# Patient Record
Sex: FEMALE | Race: WHITE | NOT HISPANIC OR LATINO | Employment: OTHER | URBAN - NONMETROPOLITAN AREA
[De-identification: names, ages, dates, MRNs, and addresses within clinical notes are randomized per-mention and may not be internally consistent; named-entity substitution may affect disease eponyms.]

---

## 2017-06-01 ENCOUNTER — OFFICE VISIT (OUTPATIENT)
Dept: CARDIOLOGY | Facility: CLINIC | Age: 72
End: 2017-06-01
Payer: MEDICARE

## 2017-06-01 VITALS
BODY MASS INDEX: 34.16 KG/M2 | OXYGEN SATURATION: 94 % | HEIGHT: 60 IN | SYSTOLIC BLOOD PRESSURE: 142 MMHG | HEART RATE: 55 BPM | WEIGHT: 174 LBS | DIASTOLIC BLOOD PRESSURE: 80 MMHG

## 2017-06-01 DIAGNOSIS — D75.89 MACROCYTOSIS WITHOUT ANEMIA: ICD-10-CM

## 2017-06-01 DIAGNOSIS — R73.9 HYPERGLYCEMIA: ICD-10-CM

## 2017-06-01 DIAGNOSIS — G47.34 NOCTURNAL HYPOXEMIA: ICD-10-CM

## 2017-06-01 DIAGNOSIS — G47.33 OSA ON CPAP: ICD-10-CM

## 2017-06-01 DIAGNOSIS — R60.9 EDEMA, UNSPECIFIED TYPE: ICD-10-CM

## 2017-06-01 DIAGNOSIS — I10 ESSENTIAL HYPERTENSION: ICD-10-CM

## 2017-06-01 DIAGNOSIS — E78.5 HYPERLIPIDEMIA, UNSPECIFIED HYPERLIPIDEMIA TYPE: ICD-10-CM

## 2017-06-01 DIAGNOSIS — I51.7 CARDIOMEGALY: ICD-10-CM

## 2017-06-01 DIAGNOSIS — E66.9 OBESITY (BMI 30.0-34.9): ICD-10-CM

## 2017-06-01 PROCEDURE — 3017F COLORECTAL CA SCREEN DOC REV: CPT | Mod: 8P | Performed by: INTERNAL MEDICINE

## 2017-06-01 PROCEDURE — 99214 OFFICE O/P EST MOD 30 MIN: CPT | Performed by: INTERNAL MEDICINE

## 2017-06-01 PROCEDURE — G8419 CALC BMI OUT NRM PARAM NOF/U: HCPCS | Performed by: INTERNAL MEDICINE

## 2017-06-01 PROCEDURE — 3014F SCREEN MAMMO DOC REV: CPT | Mod: 8P | Performed by: INTERNAL MEDICINE

## 2017-06-01 PROCEDURE — 1036F TOBACCO NON-USER: CPT | Performed by: INTERNAL MEDICINE

## 2017-06-01 PROCEDURE — 1101F PT FALLS ASSESS-DOCD LE1/YR: CPT | Mod: 8P | Performed by: INTERNAL MEDICINE

## 2017-06-01 PROCEDURE — 4040F PNEUMOC VAC/ADMIN/RCVD: CPT | Mod: 8P | Performed by: INTERNAL MEDICINE

## 2017-06-01 PROCEDURE — G8432 DEP SCR NOT DOC, RNG: HCPCS | Performed by: INTERNAL MEDICINE

## 2017-06-01 RX ORDER — MAGNESIUM OXIDE 400 MG/1
400 TABLET ORAL DAILY
COMMUNITY
End: 2020-08-21

## 2017-06-01 RX ORDER — NAPROXEN 500 MG/1
TABLET ORAL
COMMUNITY
Start: 2017-05-03 | End: 2020-08-21

## 2017-06-01 NOTE — MR AVS SNAPSHOT
Liz Gautam   2017 9:20 AM   Office Visit   MRN: [id number]    Department:  Heart [facility]   Dept Phone:  [phone]    Description:  Female : 1945   Provider:  Emile Cardoza MD,[facility]           Reason for Visit     Follow-Up           Allergies as of 2017     Allergen Noted Reactions    Sulfa Drugs 10/21/2013   Swelling      You were diagnosed with     Cardiomegaly   [429.3.ICD-9-CM]       Edema, unspecified type   [9890184]       Essential hypertension   [3683706]       Obesity (BMI 30.0-34.9)   [800445]       Nocturnal hypoxemia   [096901]       REINALDO on CPAP   [039873]       Hyperlipidemia, unspecified hyperlipidemia type   [4316435]       Hyperglycemia   [651510]       Macrocytosis without anemia   [261561]         Vital Signs     Blood Pressure Pulse Height Weight Body Mass Index Oxygen Saturation    142/80 mmHg 55 1.524 m (5') 78.926 kg (174 lb) 33.98 kg/m2 94%    Smoking Status                   Former Smoker           Basic Information     Date Of Birth Sex Race Ethnicity Preferred Language    1945 Female White Non- English      Your appointments     Aug 17, 2017 12:40 PM   FOLLOW UP with Emile Cardoza MD,[facility] (--)    [address] [place] [id number] [phone]              Health Maintenance        Date Due Completion Dates    IMM DTaP/Tdap/Td Vaccine (1 - Tdap) 1965 ---    PAP SMEAR 1967 ---    MAMMOGRAM 1986 ---    IMM ZOSTER VACCINE 2006 ---    BONE DENSITY 2011 ---    IMM PNEUMOCOCCAL 65+ (ADULT) LOW/MEDIUM RISK SERIES (1 of 2 - PCV13) 2011 ---    COLONOSCOPY 10/23/2023 10/23/2013            Current Immunizations     No immunizations on file.      Below and/or attached are the medications your provider expects you to take. Review all of your home medications and newly ordered medications with your provider and/or pharmacist. Follow  medication instructions as directed by your provider and/or pharmacist. Please keep your medication list with you and share with your provider. Update the information when medications are discontinued, doses are changed, or new medications (including over-the-counter products) are added; and carry medication information at all times in the event of emergency situations     Allergies:  SULFA DRUGS - Swelling               Medications  Valid as of: June 01, 2017 -  9:47 AM    Generic Name Brand Name Tablet Size Instructions for use    Acyclovir (Tab) ZOVIRAX 400 MG         Bortezomib   by Injection route every 7 days.        Calcium Carbonate (Tab) OS-NICOLE 500 1250 (500 CA) MG Take 2,500 mg by mouth every day.        Cholecalciferol   Take  by mouth.        Citalopram Hydrobromide (Tab) CELEXA 20 MG Take 20 mg by mouth every day.        Dexamethasone (Tab) DECADRON 4 MG Take 40 mg by mouth every day.        Furosemide (Tab) LASIX 20 MG Take 20 mg by mouth 2 times a day.        Hydrocodone-Acetaminophen (Tab) NORCO 5-325 MG Take 1-2 Tabs by mouth every four hours as needed.        Lisinopril-Hydrochlorothiazide (Tab) PRINZIDE, ZESTORETIC 10-12.5 MG Take 1 Tab by mouth every day.        Magnesium Oxide (Tab) MAG- MG Take 400 mg by mouth every day.        Multiple Vitamins-Minerals (Tab) THERAGRAN-M  Take 1 Tab by mouth every day.        Naproxen (Tab) NAPROSYN 500 MG         Nystatin (Suspension) MYCOSTATIN 458316 UNIT/ML         Potassium Bicarbonate (Cap) Potassium Bicarbonate 99 MG Take  by mouth.        TraMADol HCl (Tab) ULTRAM 50 MG         .                 Medicines prescribed today were sent to:     None      Medication refill instructions:       If your prescription bottle indicates you have medication refills left, it is not necessary to call your provider’s office. Please contact your pharmacy and they will refill your medication.    If your prescription bottle indicates you do not have any refills  left, you may request refills at any time through one of the following ways: The online [employer] system (except Urgent Care), by calling your provider’s office, or by asking your pharmacy to contact your provider’s office with a refill request. Medication refills are processed only during regular business hours and may not be available until the next business day. Your provider may request additional information or to have a follow-up visit with you prior to refilling your medication.   *Please Note: Medication refills are assigned a new Rx number when refilled electronically. Your pharmacy may indicate that no refills were authorized even though a new prescription for the same medication is available at the pharmacy. Please request the medicine by name with the pharmacy before contacting your provider for a refill.        Your To Do List     Future Labs/Procedures Complete By Expires    COMP METABOLIC PANEL  As directed 6/1/2018    TSH  As directed 6/1/2018         [employer] Access Code: Activation code not generated  Current [employer] Status: Active

## 2017-06-01 NOTE — PROGRESS NOTES
Chief Complaint   Patient presents with   • Follow-Up       This patient is an established female who is here today to discuss:  Follow HLD, Sleep consult; REINALDO on CPAP with more energy, better sleep; Abn lab discussed, will recheck    There are no active problems to display for this patient.      Past Medical History   Diagnosis Date   • Arrhythmia      IRREGULAR HEART RATE HX.//BRADYCARDIA  AT TIMES   • Other specified disorder of intestines      INTERNAL HEMORROIDS   • Hypertension      ON LISINOPRIL   • Cancer (CMS-HCC)      PT STATES HX OF MGUS (pre cancer condition)   • Pain      chronic rt leg muscle ache   • Pelvic fracture (CMS-HCC)      non traumatic     Past Surgical History   Procedure Laterality Date   • Colonoscopy  2008     family hx of colon cancer   • Appendectomy     • Oophorectomy     • Exploratory laparotomy     • Cataract extraction with iol     • Colonoscopy - endo  10/23/2013     Performed by Surinder Sanchez M.D. at SURGERY [facility]       Current Outpatient Prescriptions   Medication Sig Dispense Refill   • naproxen (NAPROSYN) 500 MG Tab      • Potassium Bicarbonate 99 MG Cap Take  by mouth.     • magnesium oxide (MAG-OX) 400 MG Tab Take 400 mg by mouth every day.     • lisinopril-hydrochlorothiazide (PRINZIDE, ZESTORETIC) 10-12.5 MG per tablet Take 1 Tab by mouth every day.     • calcium carbonate (OS-NICOLE 500) 1250 MG TABS Take 2,500 mg by mouth every day.     • Cholecalciferol (VITAMIN D-3 PO) Take  by mouth.     • therapeutic multivitamin-minerals (THERAGRAN-M) TABS Take 1 Tab by mouth every day.     • tramadol (ULTRAM) 50 MG Tab      • acyclovir (ZOVIRAX) 400 MG tablet      • furosemide (LASIX) 20 MG Tab Take 20 mg by mouth 2 times a day.     • nystatin (MYCOSTATIN) 204362 UNIT/ML SUSP      • hydrocodone-acetaminophen (NORCO) 5-325 MG TABS per tablet Take 1-2 Tabs by mouth every four hours as needed.     • dexamethasone (DECADRON) 4 MG TABS Take 40 mg by mouth every day.     • Bortezomib  (VELCADE INJ) by Injection route every 7 days.     • citalopram (CELEXA) 20 MG TABS Take 20 mg by mouth every day.       No current facility-administered medications for this visit.     Sulfa drugs      Review of Systems:     Constitutional: Denies fevers, Denies weight changes  Eyes: Denies changes in vision, no eye pain  Ears/Nose/Throat/Mouth: Denies nasal congestion or sore throat   Cardiovascular: Denies chest pain or palpitations   Respiratory: Denies shortness of breath , Denies cough  Gastrointestinal/Hepatic: Denies abdominal pain, nausea, vomiting, diarrhea, constipation or GI bleeding   Genitourinary: Denies bladder dysfunction, dysuria or frequency  Musculoskeletal/Rheum: Denies  joint pain and swelling   Skin/Breast: Denies rash, denies breast lumps or discharge  Neurological: Denies headache, confusion, memory loss or focal weakness/parasthesias  Psychiatric: denies mood disorder   Endocrine: denies hx of diabetes or thyroid dysfunction  Heme/Oncology/Lymph Nodes: Denies enlarged lymph nodes, denies brusing or known bleeding disorder  Allergic/Immunologic:  hx of allergies      All other systems were reviewed and are negative (AMA/CMS criteria)      Blood pressure 142/80, pulse 55, height 1.524 m (5'), weight 78.926 kg (174 lb), SpO2 94 %.  General Appearance: Well developed, Well nourished, No acute distress, Non-toxic appearance. Reproducible costochondral pain;  HENT: Normocephalic, Atraumatic, Oropharynx moist mucous membranes, Dentition: Mallampti 3-4 OP, Nose normal.    Eyes: PERRLA, EOMI, Conjunctiva normal, No discharge.  Neck: Normal range of motion, No cervical tenderness, Supple, No stridor, no JVD . No thyromegaly. No carotid bruit.  Cardiovascular: Slow heart rate, Normal rhythm, nl S1, S2, no S3, S4; No gallops; 1/6 SE murmurs at M&LLSB, No rubs, . Extremitites with intact distal pulses, no cyanosis, clubbing , edema. No heaves, thrills, HJR;  Peripheral pulses: carotid 2+, brachial 2+,  radial 2+, ulnar 2+, femoral 2+, popliteal 1+, PT 2+, DP 2+;  Lungs: Respiratory effort is normal. Normal breath sounds, breath sounds clear to auscultation bilaterally, no rales, no rhonchi, no wheezing.    Abdomen: Bowel sounds normal, Soft, No tenderness, No guarding, No rebound, No masses, No hepatosplenomegaly.  Skin: Warm, Dry, No erythema, No rash, no induration or crepitus.  Neurologic: Alert & oriented x 3, Normal motor function, Normal sensory function, No focal deficits noted, cranial nerves II through XII are normal, normal gait w/o cane.  Psychiatric: Affect normal, Judgment normal, Mood normal    Results for DAI PATEL (MRN [id number]) as of 6/1/2017 09:30   Ref. Range 6/29/2016 07:40 6/29/2016 08:59 8/5/2016 12:04 12/20/2016 07:30   WBC Latest Ref Range: 4.8-10.8 K/uL   7.0 8.3   RBC Latest Ref Range: 4.20-5.40 M/uL   4.75 4.59   Hemoglobin Latest Ref Range: 13.0-17.0 g/dL   14.2 14.8   Hematocrit Latest Ref Range: 39.0-50.0 %   44.3 46.6   MCV Latest Ref Range: 81.0-99.0 fL   93.3 101.5 (H)   MCH Latest Ref Range: 27.0-31.0 pg   29.9 32.2 (H)   MCHC Latest Ref Range: 33.0-37.0 g/dL   32.1 (L) 31.8 (L)   RDW Latest Ref Range: 11.5-14.5 %   15.9 (H) 13.7   Platelet Count Latest Ref Range: 130-400 K/uL   184 206   MPV Latest Ref Range: 7.4-10.4 fL   9.1 9.2   Neutrophils Automated Latest Ref Range: 39.0-70.0 %   65.8 74.9 (H)   Abs Neutrophils Automated Latest Ref Range: 1.8-7.7 K/uL   4.6 6.2   Lymphocytes Automated Latest Ref Range: 21.0-50.0 %   27.6 18.3 (L)   Abs Lymph Automated Latest Ref Range: 1.2-4.8 K/uL   1.9 1.5   Eosinophils Automated Latest Ref Range: 0.0-5.0 %   1.0 0.8   Basophils Automated Latest Ref Range: 0.0-3.0 %   0.3 0.4   Monocytes Automated Latest Ref Range: 2.0-9.0 %   5.0 5.0   Eosinophil Count, Blood Latest Ref Range: 0.00-0.50 K/uL   0.07 0.07   Sodium Latest Ref Range: 136-145 mmol/L   145 149 (H)   Potassium Latest Ref Range: 3.5-5.1 mmol/L   4.3 4.5   Chloride Latest  Ref Range:  mmol/L   110 (H) 110 (H)   Co2 Latest Ref Range: 21-32 mmol/L   31 31   Anion Gap Latest Ref Range: 10-18 mmol/L   8 (L) 13   Glucose Latest Ref Range: 74-99 mg/dL   94 116 (H)   Bun Latest Ref Range: 7-18 mg/dL   18 23 (H)   Creatinine Latest Ref Range: 0.6-1.0 mg/dL   0.6 0.7   GFR If  Latest Ref Range: >60 mL/min/1.73 m 2   >60 >60   GFR If Non  Latest Ref Range: >60 mL/min/1.73 m 2   >60 >60   Calcium Latest Ref Range: 8.5-11.0 mg/dL   9.5 10.3   AST(SGOT) Latest Ref Range: 15-37 U/L   17 18   ALT(SGPT) Latest Ref Range: 12-78 U/L   24 26   Alkaline Phosphatase Latest Ref Range:  U/L   104 96   Total Bilirubin Latest Ref Range: 0.2-1.0 mg/dL   0.5 0.5   Albumin Latest Ref Range: 3.4-5.0 g/dL   3.6 3.6   Total Protein Latest Ref Range: 6.4-8.2 g/dL   6.3 (L) 6.4   A-G Ratio Unknown   1.3 1.3   Glycohemoglobin Latest Ref Range: <6.0 %  5.7 (H)     Estim. Avg Glu Latest Units: mg/dL  117     Cholesterol,Tot Latest Ref Range: 120-200 mg/dL 185      Triglycerides Latest Ref Range: 0-150 mg/dL 53      HDL Latest Ref Range: 40.0-60.0 mg/dL 70.0 (H)      Non HDL Cholesterol Latest Ref Range:   115      LDL Latest Ref Range: <100 mg/dL 104 (H)      Chol-Hdl Ratio Unknown 2.64          Assessment and Plan.   71 y.o. female has REINALDO and now on CPAP with sig improvement; Cardiac aspects are stable and asx; will follow; Pls see orders; w/u for Macrocytosis;     1. Cardiomegaly  stable    2. Edema, unspecified type  Trace now; better than before    3. Essential hypertension  controlled    4. Obesity (BMI 30.0-34.9)  stable    5. Nocturnal hypoxemia  REINALDO    6. REINALDO on CPAP  continue    7. Hyperlipidemia, unspecified hyperlipidemia type  recheck      1. Cardiomegaly     2. Edema, unspecified type     3. Essential hypertension     4. Obesity (BMI 30.0-34.9)     5. Nocturnal hypoxemia     6. REINALDO on CPAP     7. Hyperlipidemia, unspecified hyperlipidemia type

## 2017-06-01 NOTE — Clinical Note
[facility] [facility] [address],   [address]  [place] [address]  Phone: [phone]  Fax: [phone]              Liz Florez  1945    Encounter Date: 6/1/2017    Gerardo Bishop M.D.    Thank you for the referral. I had the pleasure of seeing Liz Florez today in cardiology clinic. I've attached my visit note below. If you have any questions please feel free to give me a call anytime.      Emile Cardoza MD, PhD, [facility]  Cardiology and Lipidology  [facility]                                                                PROGRESS NOTE:  Chief Complaint   Patient presents with   • Follow-Up       This patient is an established female who is here today to discuss:  Follow HLD, Sleep consult; REINALDO on CPAP with more energy, better sleep; Abn lab discussed, will recheck    There are no active problems to display for this patient.      Past Medical History   Diagnosis Date   • Arrhythmia      IRREGULAR HEART RATE HX.//BRADYCARDIA  AT TIMES   • Other specified disorder of intestines      INTERNAL HEMORROIDS   • Hypertension      ON LISINOPRIL   • Cancer (CMS-HCC)      PT STATES HX OF MGUS (pre cancer condition)   • Pain      chronic rt leg muscle ache   • Pelvic fracture (CMS-HCC)      non traumatic     Past Surgical History   Procedure Laterality Date   • Colonoscopy  2008     family hx of colon cancer   • Appendectomy     • Oophorectomy     • Exploratory laparotomy     • Cataract extraction with iol     • Colonoscopy - endo  10/23/2013     Performed by Surinder Sanchez M.D. at SURGERY [facility]       Current Outpatient Prescriptions   Medication Sig Dispense Refill   • naproxen (NAPROSYN) 500 MG Tab      • Potassium Bicarbonate 99 MG Cap Take  by mouth.     • magnesium oxide (MAG-OX) 400 MG Tab Take 400 mg by mouth every day.     • lisinopril-hydrochlorothiazide (PRINZIDE, ZESTORETIC) 10-12.5 MG per tablet  Take 1 Tab by mouth every day.     • calcium carbonate (OS-NICOLE 500) 1250 MG TABS Take 2,500 mg by mouth every day.     • Cholecalciferol (VITAMIN D-3 PO) Take  by mouth.     • therapeutic multivitamin-minerals (THERAGRAN-M) TABS Take 1 Tab by mouth every day.     • tramadol (ULTRAM) 50 MG Tab      • acyclovir (ZOVIRAX) 400 MG tablet      • furosemide (LASIX) 20 MG Tab Take 20 mg by mouth 2 times a day.     • nystatin (MYCOSTATIN) 100755 UNIT/ML SUSP      • hydrocodone-acetaminophen (NORCO) 5-325 MG TABS per tablet Take 1-2 Tabs by mouth every four hours as needed.     • dexamethasone (DECADRON) 4 MG TABS Take 40 mg by mouth every day.     • Bortezomib (VELCADE INJ) by Injection route every 7 days.     • citalopram (CELEXA) 20 MG TABS Take 20 mg by mouth every day.       No current facility-administered medications for this visit.     Sulfa drugs      Review of Systems:     Constitutional: Denies fevers, Denies weight changes  Eyes: Denies changes in vision, no eye pain  Ears/Nose/Throat/Mouth: Denies nasal congestion or sore throat   Cardiovascular: Denies chest pain or palpitations   Respiratory: Denies shortness of breath , Denies cough  Gastrointestinal/Hepatic: Denies abdominal pain, nausea, vomiting, diarrhea, constipation or GI bleeding   Genitourinary: Denies bladder dysfunction, dysuria or frequency  Musculoskeletal/Rheum: Denies  joint pain and swelling   Skin/Breast: Denies rash, denies breast lumps or discharge  Neurological: Denies headache, confusion, memory loss or focal weakness/parasthesias  Psychiatric: denies mood disorder   Endocrine: denies hx of diabetes or thyroid dysfunction  Heme/Oncology/Lymph Nodes: Denies enlarged lymph nodes, denies brusing or known bleeding disorder  Allergic/Immunologic:  hx of allergies      All other systems were reviewed and are negative (AMA/CMS criteria)      Blood pressure 142/80, pulse 55, height 1.524 m (5'), weight 78.926 kg (174 lb), SpO2 94 %.  General  Appearance: Well developed, Well nourished, No acute distress, Non-toxic appearance. Reproducible costochondral pain;  HENT: Normocephalic, Atraumatic, Oropharynx moist mucous membranes, Dentition: Mallampti 3-4 OP, Nose normal.    Eyes: PERRLA, EOMI, Conjunctiva normal, No discharge.  Neck: Normal range of motion, No cervical tenderness, Supple, No stridor, no JVD . No thyromegaly. No carotid bruit.  Cardiovascular: Slow heart rate, Normal rhythm, nl S1, S2, no S3, S4; No gallops; 1/6 SE murmurs at M&LLSB, No rubs, . Extremitites with intact distal pulses, no cyanosis, clubbing , edema. No heaves, thrills, HJR;  Peripheral pulses: carotid 2+, brachial 2+, radial 2+, ulnar 2+, femoral 2+, popliteal 1+, PT 2+, DP 2+;  Lungs: Respiratory effort is normal. Normal breath sounds, breath sounds clear to auscultation bilaterally, no rales, no rhonchi, no wheezing.    Abdomen: Bowel sounds normal, Soft, No tenderness, No guarding, No rebound, No masses, No hepatosplenomegaly.  Skin: Warm, Dry, No erythema, No rash, no induration or crepitus.  Neurologic: Alert & oriented x 3, Normal motor function, Normal sensory function, No focal deficits noted, cranial nerves II through XII are normal, normal gait w/o cane.  Psychiatric: Affect normal, Judgment normal, Mood normal    Results for DAI PATEL (MRN [id number]) as of 6/1/2017 09:30   Ref. Range 6/29/2016 07:40 6/29/2016 08:59 8/5/2016 12:04 12/20/2016 07:30   WBC Latest Ref Range: 4.8-10.8 K/uL   7.0 8.3   RBC Latest Ref Range: 4.20-5.40 M/uL   4.75 4.59   Hemoglobin Latest Ref Range: 13.0-17.0 g/dL   14.2 14.8   Hematocrit Latest Ref Range: 39.0-50.0 %   44.3 46.6   MCV Latest Ref Range: 81.0-99.0 fL   93.3 101.5 (H)   MCH Latest Ref Range: 27.0-31.0 pg   29.9 32.2 (H)   MCHC Latest Ref Range: 33.0-37.0 g/dL   32.1 (L) 31.8 (L)   RDW Latest Ref Range: 11.5-14.5 %   15.9 (H) 13.7   Platelet Count Latest Ref Range: 130-400 K/uL   184 206   MPV Latest Ref Range: 7.4-10.4 fL    9.1 9.2   Neutrophils Automated Latest Ref Range: 39.0-70.0 %   65.8 74.9 (H)   Abs Neutrophils Automated Latest Ref Range: 1.8-7.7 K/uL   4.6 6.2   Lymphocytes Automated Latest Ref Range: 21.0-50.0 %   27.6 18.3 (L)   Abs Lymph Automated Latest Ref Range: 1.2-4.8 K/uL   1.9 1.5   Eosinophils Automated Latest Ref Range: 0.0-5.0 %   1.0 0.8   Basophils Automated Latest Ref Range: 0.0-3.0 %   0.3 0.4   Monocytes Automated Latest Ref Range: 2.0-9.0 %   5.0 5.0   Eosinophil Count, Blood Latest Ref Range: 0.00-0.50 K/uL   0.07 0.07   Sodium Latest Ref Range: 136-145 mmol/L   145 149 (H)   Potassium Latest Ref Range: 3.5-5.1 mmol/L   4.3 4.5   Chloride Latest Ref Range:  mmol/L   110 (H) 110 (H)   Co2 Latest Ref Range: 21-32 mmol/L   31 31   Anion Gap Latest Ref Range: 10-18 mmol/L   8 (L) 13   Glucose Latest Ref Range: 74-99 mg/dL   94 116 (H)   Bun Latest Ref Range: 7-18 mg/dL   18 23 (H)   Creatinine Latest Ref Range: 0.6-1.0 mg/dL   0.6 0.7   GFR If  Latest Ref Range: >60 mL/min/1.73 m 2   >60 >60   GFR If Non  Latest Ref Range: >60 mL/min/1.73 m 2   >60 >60   Calcium Latest Ref Range: 8.5-11.0 mg/dL   9.5 10.3   AST(SGOT) Latest Ref Range: 15-37 U/L   17 18   ALT(SGPT) Latest Ref Range: 12-78 U/L   24 26   Alkaline Phosphatase Latest Ref Range:  U/L   104 96   Total Bilirubin Latest Ref Range: 0.2-1.0 mg/dL   0.5 0.5   Albumin Latest Ref Range: 3.4-5.0 g/dL   3.6 3.6   Total Protein Latest Ref Range: 6.4-8.2 g/dL   6.3 (L) 6.4   A-G Ratio Unknown   1.3 1.3   Glycohemoglobin Latest Ref Range: <6.0 %  5.7 (H)     Estim. Avg Glu Latest Units: mg/dL  117     Cholesterol,Tot Latest Ref Range: 120-200 mg/dL 185      Triglycerides Latest Ref Range: 0-150 mg/dL 53      HDL Latest Ref Range: 40.0-60.0 mg/dL 70.0 (H)      Non HDL Cholesterol Latest Ref Range:   115      LDL Latest Ref Range: <100 mg/dL 104 (H)      Chol-Hdl Ratio Unknown 2.64          Assessment and Plan.   71  y.o. female has REINALDO and now on CPAP with sig improvement; Cardiac aspects are stable and asx; will follow; Pls see orders; w/u for Macrocytosis;     1. Cardiomegaly  stable    2. Edema, unspecified type  Trace now; better than before    3. Essential hypertension  controlled    4. Obesity (BMI 30.0-34.9)  stable    5. Nocturnal hypoxemia  REINALDO    6. REINALDO on CPAP  continue    7. Hyperlipidemia, unspecified hyperlipidemia type  recheck      1. Cardiomegaly     2. Edema, unspecified type     3. Essential hypertension     4. Obesity (BMI 30.0-34.9)     5. Nocturnal hypoxemia     6. REINALDO on CPAP     7. Hyperlipidemia, unspecified hyperlipidemia type               Gerardo Bishop M.D.  [address] [place] [address] [place] [address]  VIA Facsimile: [phone]

## 2017-08-17 ENCOUNTER — OFFICE VISIT (OUTPATIENT)
Dept: CARDIOLOGY | Facility: CLINIC | Age: 72
End: 2017-08-17
Payer: MEDICARE

## 2017-08-17 VITALS
WEIGHT: 171 LBS | HEIGHT: 60 IN | SYSTOLIC BLOOD PRESSURE: 120 MMHG | HEART RATE: 43 BPM | BODY MASS INDEX: 33.57 KG/M2 | DIASTOLIC BLOOD PRESSURE: 80 MMHG | OXYGEN SATURATION: 92 %

## 2017-08-17 DIAGNOSIS — R60.9 EDEMA, UNSPECIFIED TYPE: ICD-10-CM

## 2017-08-17 DIAGNOSIS — E66.9 OBESITY (BMI 30.0-34.9): ICD-10-CM

## 2017-08-17 DIAGNOSIS — I10 ESSENTIAL HYPERTENSION: ICD-10-CM

## 2017-08-17 DIAGNOSIS — I51.7 CARDIOMEGALY: ICD-10-CM

## 2017-08-17 DIAGNOSIS — E78.5 HYPERLIPIDEMIA, UNSPECIFIED HYPERLIPIDEMIA TYPE: ICD-10-CM

## 2017-08-17 DIAGNOSIS — G47.33 OSA ON CPAP: ICD-10-CM

## 2017-08-17 PROCEDURE — 99214 OFFICE O/P EST MOD 30 MIN: CPT | Performed by: INTERNAL MEDICINE

## 2017-08-17 NOTE — PROGRESS NOTES
Chief Complaint   Patient presents with   • Follow-Up     HTN         This patient is an established female who is here today to discuss:  Follow HLD, Sleep consult; REINALDO on CPAP with more energy, better sleep; Abn lab discussed, will recheck    There are no active problems to display for this patient.      Past Medical History   Diagnosis Date   • Arrhythmia      IRREGULAR HEART RATE HX.//BRADYCARDIA  AT TIMES   • Other specified disorder of intestines      INTERNAL HEMORROIDS   • Hypertension      ON LISINOPRIL   • Cancer (CMS-HCC)      PT STATES HX OF MGUS (pre cancer condition)   • Pain      chronic rt leg muscle ache   • Pelvic fracture (CMS-HCC)      non traumatic     Past Surgical History   Procedure Laterality Date   • Colonoscopy  2008     family hx of colon cancer   • Appendectomy     • Oophorectomy     • Exploratory laparotomy     • Cataract extraction with iol     • Colonoscopy - endo  10/23/2013     Performed by Surinder Sanchez M.D. at [facility]     Social History     Social History   • Marital Status: Single     Spouse Name: N/A   • Number of Children: N/A   • Years of Education: N/A     Social History Main Topics   • Smoking status: Former Smoker     Quit date: 10/21/1996   • Smokeless tobacco: None   • Alcohol Use: Yes   • Drug Use: No   • Sexual Activity: Not Asked     Other Topics Concern   • None     Social History Narrative     Family History   Problem Relation Age of Onset   • Heart Disease Maternal Uncle        Current Outpatient Prescriptions   Medication Sig Dispense Refill   • naproxen (NAPROSYN) 500 MG Tab      • magnesium oxide (MAG-OX) 400 MG Tab Take 400 mg by mouth every day.     • lisinopril-hydrochlorothiazide (PRINZIDE, ZESTORETIC) 10-12.5 MG per tablet Take 1 Tab by mouth every day.     • calcium carbonate (OS-NICOLE 500) 1250 MG TABS Take 2,500 mg by mouth every day.     • Cholecalciferol (VITAMIN D-3 PO) Take  by mouth.     • therapeutic multivitamin-minerals (THERAGRAN-M)  TABS Take 1 Tab by mouth every day.     • Potassium Bicarbonate 99 MG Cap Take  by mouth.     • tramadol (ULTRAM) 50 MG Tab      • acyclovir (ZOVIRAX) 400 MG tablet      • furosemide (LASIX) 20 MG Tab Take 20 mg by mouth 2 times a day.     • nystatin (MYCOSTATIN) 321608 UNIT/ML SUSP      • hydrocodone-acetaminophen (NORCO) 5-325 MG TABS per tablet Take 1-2 Tabs by mouth every four hours as needed.     • dexamethasone (DECADRON) 4 MG TABS Take 40 mg by mouth every day.     • Bortezomib (VELCADE INJ) by Injection route every 7 days.     • citalopram (CELEXA) 20 MG TABS Take 20 mg by mouth every day.       No current facility-administered medications for this visit.     Sulfa drugs    Review of Systems:     Constitutional: Denies fevers, Denies weight changes  Eyes: Denies changes in vision, no eye pain  Ears/Nose/Throat/Mouth: Denies nasal congestion or sore throat   Cardiovascular: Denies chest pain or palpitations   Respiratory: Denies shortness of breath , Denies cough  Gastrointestinal/Hepatic: Denies abdominal pain, nausea, vomiting, diarrhea, constipation or GI bleeding   Genitourinary: Denies bladder dysfunction, dysuria or frequency  Musculoskeletal/Rheum: Denies  joint pain and swelling   Skin/Breast: Denies rash, denies breast lumps or discharge  Neurological: Denies headache, confusion, memory loss or focal weakness/parasthesias  Psychiatric: denies mood disorder   Endocrine: denies hx of diabetes or thyroid dysfunction  Heme/Oncology/Lymph Nodes: Denies enlarged lymph nodes, denies brusing or known bleeding disorder  Allergic/Immunologic:  hx of allergies      All other systems were reviewed and are negative (AMA/CMS criteria)      Blood pressure 120/80, pulse 43, height 1.524 m (5'), weight 77.565 kg (171 lb), SpO2 92 %.  General Appearance: Well developed, Well nourished, No acute distress, Non-toxic appearance. Reproducible costochondral pain;  HENT: Normocephalic, Atraumatic, Oropharynx moist mucous  membranes, Dentition: Mallampti 3-4 OP, Nose normal.    Eyes: PERRLA, EOMI, Conjunctiva normal, No discharge.  Neck: Normal range of motion, No cervical tenderness, Supple, No stridor, no JVD . No thyromegaly. No carotid bruit.  Cardiovascular: Slow heart rate, Normal rhythm, nl S1, S2, no S3, S4; No gallops; 1/6 SE murmurs at M&LLSB, No rubs, . Extremitites with intact distal pulses, no cyanosis, clubbing , edema. No heaves, thrills, HJR;  Peripheral pulses: carotid 2+, brachial 2+, radial 2+, ulnar 2+, femoral 2+, popliteal 1+, PT 2+, DP 2+;  Lungs: Respiratory effort is normal. Normal breath sounds, breath sounds clear to auscultation bilaterally, no rales, no rhonchi, no wheezing.    Abdomen: Bowel sounds normal, Soft, No tenderness, No guarding, No rebound, No masses, No hepatosplenomegaly.  Skin: Warm, Dry, No erythema, No rash, no induration or crepitus.  Neurologic: Alert & oriented x 3, Normal motor function, Normal sensory function, No focal deficits noted, cranial nerves II through XII are normal, normal gait w/o cane.  Psychiatric: Affect normal, Judgment normal, Mood normal    Results for DAI PATEL (MRN [id number]) as of 8/17/2017 12:38   Ref. Range 12/20/2016 07:30 7/20/2017 09:40 8/14/2017 12:53   WBC Latest Ref Range: 4.8-10.8 K/uL 8.3 6.7    RBC Latest Ref Range: 4.20-5.40 M/uL 4.59 4.71    Hemoglobin Latest Ref Range: 13.0-17.0 g/dL 14.8 14.4    Hematocrit Latest Ref Range: 39.0-50.0 % 46.6 44.8    MCV Latest Ref Range: 81.0-99.0 fL 101.5 (H) 95.1    MCH Latest Ref Range: 27.0-31.0 pg 32.2 (H) 30.6    MCHC Latest Ref Range: 33.0-37.0 g/dL 31.8 (L) 32.1 (L)    RDW Latest Ref Range: 11.5-14.5 % 13.7 14.1    Platelet Count Latest Ref Range: 130-400 K/uL 206 179    MPV Latest Ref Range: 7.4-10.4 fL 9.2 9.7    Neutrophils Automated Latest Ref Range: 39.0-70.0 % 74.9 (H) 70.9 (H)    Abs Neutrophils Automated Latest Ref Range: 1.8-7.7 K/uL 6.2 4.7    Lymphocytes Automated Latest Ref Range: 21.0-50.0 %  18.3 (L) 24.1    Abs Lymph Automated Latest Ref Range: 1.2-4.8 K/uL 1.5 1.6    Eosinophils Automated Latest Ref Range: 0.0-5.0 % 0.8 0.4    Basophils Automated Latest Ref Range: 0.0-3.0 % 0.4 0.3    Monocytes Automated Latest Ref Range: 2.0-9.0 % 5.0 3.9    Eosinophil Count, Blood Latest Ref Range: 0.00-0.50 K/uL 0.07 0.03    Sodium Latest Ref Range: 136-145 mmol/L 149 (H) 143    Potassium Latest Ref Range: 3.5-5.1 mmol/L 4.5 4.1    Chloride Latest Ref Range:  mmol/L 110 (H) 107    Co2 Latest Ref Range: 21-32 mmol/L 31 28    Anion Gap Latest Ref Range: 10-18 mmol/L 13 12    Glucose Latest Ref Range: 74-99 mg/dL 116 (H) 108 (H)    Bun Latest Ref Range: 7-18 mg/dL 23 (H) 18    Creatinine Latest Ref Range: 0.6-1.0 mg/dL 0.7 0.7    GFR If  Latest Ref Range: >60 mL/min/1.73 m 2 >60 >60    GFR If Non  Latest Ref Range: >60 mL/min/1.73 m 2 >60 >60    Calcium Latest Ref Range: 8.5-11.0 mg/dL 10.3 9.4    AST(SGOT) Latest Ref Range: 15-37 U/L 18 17    ALT(SGPT) Latest Ref Range: 12-78 U/L 26 27    Alkaline Phosphatase Latest Ref Range:  U/L 96 87    Total Bilirubin Latest Ref Range: 0.2-1.0 mg/dL 0.5 0.5    Albumin Latest Ref Range: 3.4-5.0 g/dL 3.6 3.7    Total Protein Latest Ref Range: 6.4-8.2 g/dL 6.4 6.8    A-G Ratio Unknown 1.3 1.2    IgA Total Latest Ref Range:  mg/dL 74 (L) 85    IgG Total Latest Ref Range: 694-1618 mg/dL 675 (L) 901    IgM Total Latest Ref Range:  mg/dL 44 (L) 33 (L)    Total Protein Latest Ref Range: 6.1-8.1 g/dL 6.3 6.4    Albumin Latest Ref Range: 3.8-4.8 g/dL 3.9 3.9    Gamma Globulin Latest Ref Range: 0.8-1.7 g/dL 0.7 (L) 0.9    Alpha-1 Globulin Latest Ref Range: 0.2-0.3 g/dL 0.3 0.2    Alpha-2 Globulin Latest Ref Range: 0.5-0.9 g/dL 0.8 0.7    Free Kappa Light Chains Latest Ref Range: 3.3-19.4 mg/L 9.2 8.1    Free Lambda Light Chains Latest Ref Range: 5.7-26.3 mg/L 12.0 8.1    Kappa-Lambda Ratio Latest Ref Range: 0.26-1.65  0.77 1.00     Protein Elect. Interp Unknown See Below See Below        Assessment and Plan.   71 y.o. female is with ENT, sleep eval with REINALDO, acid reflux and running nose; Pepsid use may help; She will also try to cut down artificial sweetner use and try to lose weight.     1. Cardiomegaly  reviewed    2. Edema, unspecified type  Mild radha on left but stable    3. Essential hypertension  controlled    4. Obesity (BMI 30.0-34.9)  Stable, see above    5. REINALDO on CPAP  continue    6. Hyperlipidemia, unspecified hyperlipidemia type  recheck      Return to clinic in  5  months    1. Cardiomegaly     2. Edema, unspecified type     3. Essential hypertension     4. Obesity (BMI 30.0-34.9)     5. REINALDO on CPAP     6. Hyperlipidemia, unspecified hyperlipidemia type

## 2017-08-17 NOTE — MR AVS SNAPSHOT
Liz Menesescarlos eduardo   2017 12:40 PM   Office Visit   MRN: [id number]    Department:  Heart [facility]   Dept Phone:  [phone]    Description:  Female : 1945   Provider:  Emile Cardoza MD,[facility]           Reason for Visit     Follow-Up HTN      Allergies as of 2017     Allergen Noted Reactions    Sulfa Drugs 10/21/2013   Swelling      You were diagnosed with     Cardiomegaly   [429.3.ICD-9-CM]       Edema, unspecified type   [8275186]       Essential hypertension   [6119562]       Obesity (BMI 30.0-34.9)   [730278]       REINALDO on CPAP   [342176]       Hyperlipidemia, unspecified hyperlipidemia type   [8005765]         Vital Signs     Blood Pressure Pulse Height Weight Body Mass Index Oxygen Saturation    120/80 mmHg 43 1.524 m (5') 77.565 kg (171 lb) 33.40 kg/m2 92%    Smoking Status                   Former Smoker           Basic Information     Date Of Birth Sex Race Ethnicity Preferred Language    1945 Female White Non- English      Your appointments     Dec 05, 2017 10:00 AM   FOLLOW UP with Em[facility] Card[facility]za MD,[facility] (--)    [address] [place] [address] [phone]              Health Maintenance        Date Due Completion Dates    IMM DTaP/Tdap/Td Vaccine (1 - Tdap) 1965 ---    PAP SMEAR 1967 ---    MAMMOGRAM 1986 ---    IMM ZOSTER VACCINE 2006 ---    BONE DENSITY 2011 ---    IMM PNEUMOCOCCAL 65+ (ADULT) LOW/MEDIUM RISK SERIES (1 of 2 - PCV13) 2011 ---    IMM INFLUENZA (1) 2017 ---    COLONOSCOPY 10/23/2023 10/23/2013            Current Immunizations     No immunizations on file.      Below and/or attached are the medications your provider expects you to take. Review all of your home medications and newly ordered medications with your provider and/or pharmacist. Follow medication instructions as directed by your provider and/or pharmacist. Please keep  your medication list with you and share with your provider. Update the information when medications are discontinued, doses are changed, or new medications (including over-the-counter products) are added; and carry medication information at all times in the event of emergency situations     Allergies:  SULFA DRUGS - Swelling               Medications  Valid as of: August 17, 2017 - 12:57 PM    Generic Name Brand Name Tablet Size Instructions for use    Acyclovir (Tab) ZOVIRAX 400 MG         Bortezomib   by Injection route every 7 days.        Calcium Carbonate (Tab) OS-NICOLE 500 1250 (500 Ca) MG Take 2,500 mg by mouth every day.        Cholecalciferol   Take  by mouth.        Citalopram Hydrobromide (Tab) CELEXA 20 MG Take 20 mg by mouth every day.        Dexamethasone (Tab) DECADRON 4 MG Take 40 mg by mouth every day.        Furosemide (Tab) LASIX 20 MG Take 20 mg by mouth 2 times a day.        Hydrocodone-Acetaminophen (Tab) NORCO 5-325 MG Take 1-2 Tabs by mouth every four hours as needed.        Lisinopril-Hydrochlorothiazide (Tab) PRINZIDE, ZESTORETIC 10-12.5 MG Take 1 Tab by mouth every day.        Magnesium Oxide (Tab) MAG- MG Take 400 mg by mouth every day.        Multiple Vitamins-Minerals (Tab) THERAGRAN-M  Take 1 Tab by mouth every day.        Naproxen (Tab) NAPROSYN 500 MG         Nystatin (Suspension) MYCOSTATIN 710641 UNIT/ML         Potassium Bicarbonate (Cap) Potassium Bicarbonate 99 MG Take  by mouth.        TraMADol HCl (Tab) ULTRAM 50 MG         .                 Medicines prescribed today were sent to:     None      Medication refill instructions:       If your prescription bottle indicates you have medication refills left, it is not necessary to call your provider’s office. Please contact your pharmacy and they will refill your medication.    If your prescription bottle indicates you do not have any refills left, you may request refills at any time through one of the following ways: The  online Learn with [employer] system (except Urgent Care), by calling your provider’s office, or by asking your pharmacy to contact your provider’s office with a refill request. Medication refills are processed only during regular business hours and may not be available until the next business day. Your provider may request additional information or to have a follow-up visit with you prior to refilling your medication.   *Please Note: Medication refills are assigned a new Rx number when refilled electronically. Your pharmacy may indicate that no refills were authorized even though a new prescription for the same medication is available at the pharmacy. Please request the medicine by name with the pharmacy before contacting your provider for a refill.           Learn with [employer] Access Code: Activation code not generated  Current Learn with Homer Status: Active

## 2017-08-17 NOTE — Clinical Note
[facility] [address],   [address]  [place] [address]  Phone: [phone]  Fax: [phone]              Liz Florez  1945    Encounter Date: 8/17/2017    Gerardo Bishop M.D.    Thank you for the referral. I had the pleasure of seeing Liz Florez today in cardiology clinic. I've attached my visit note below. If you have any questions please feel free to give me a call anytime.      Emile Cardoza MD, PhD, [facility]  Cardiology and Lipidology  [facility]                                                                    PROGRESS NOTE:  Chief Complaint   Patient presents with   • Follow-Up     HTN         This patient is an established female who is here today to discuss:  Follow HLD, Sleep consult; REINALDO on CPAP with more energy, better sleep; Abn lab discussed, will recheck    There are no active problems to display for this patient.      Past Medical History   Diagnosis Date   • Arrhythmia      IRREGULAR HEART RATE HX.//BRADYCARDIA  AT TIMES   • Other specified disorder of intestines      INTERNAL HEMORROIDS   • Hypertension      ON LISINOPRIL   • Cancer (CMS-HCC)      PT STATES HX OF MGUS (pre cancer condition)   • Pain      chronic rt leg muscle ache   • Pelvic fracture (CMS-HCC)      non traumatic     Past Surgical History   Procedure Laterality Date   • Colonoscopy  2008     family hx of colon cancer   • Appendectomy     • Oophorectomy     • Exploratory laparotomy     • Cataract extraction with iol     • Colonoscopy - endo  10/23/2013     Performed by Surinder Sanchez M.D. at [facility]     Social History     Social History   • Marital Status: Single     Spouse Name: N/A   • Number of Children: N/A   • Years of Education: N/A     Social History Main Topics   • Smoking status: Former Smoker     Quit date: 10/21/1996   • Smokeless tobacco: None   • Alcohol Use: Yes   • Drug Use: No   •  Sexual Activity: Not Asked     Other Topics Concern   • None     Social History Narrative     Family History   Problem Relation Age of Onset   • Heart Disease Maternal Uncle        Current Outpatient Prescriptions   Medication Sig Dispense Refill   • naproxen (NAPROSYN) 500 MG Tab      • magnesium oxide (MAG-OX) 400 MG Tab Take 400 mg by mouth every day.     • lisinopril-hydrochlorothiazide (PRINZIDE, ZESTORETIC) 10-12.5 MG per tablet Take 1 Tab by mouth every day.     • calcium carbonate (OS-NICOLE 500) 1250 MG TABS Take 2,500 mg by mouth every day.     • Cholecalciferol (VITAMIN D-3 PO) Take  by mouth.     • therapeutic multivitamin-minerals (THERAGRAN-M) TABS Take 1 Tab by mouth every day.     • Potassium Bicarbonate 99 MG Cap Take  by mouth.     • tramadol (ULTRAM) 50 MG Tab      • acyclovir (ZOVIRAX) 400 MG tablet      • furosemide (LASIX) 20 MG Tab Take 20 mg by mouth 2 times a day.     • nystatin (MYCOSTATIN) 580715 UNIT/ML SUSP      • hydrocodone-acetaminophen (NORCO) 5-325 MG TABS per tablet Take 1-2 Tabs by mouth every four hours as needed.     • dexamethasone (DECADRON) 4 MG TABS Take 40 mg by mouth every day.     • Bortezomib (VELCADE INJ) by Injection route every 7 days.     • citalopram (CELEXA) 20 MG TABS Take 20 mg by mouth every day.       No current facility-administered medications for this visit.     Sulfa drugs    Review of Systems:     Constitutional: Denies fevers, Denies weight changes  Eyes: Denies changes in vision, no eye pain  Ears/Nose/Throat/Mouth: Denies nasal congestion or sore throat   Cardiovascular: Denies chest pain or palpitations   Respiratory: Denies shortness of breath , Denies cough  Gastrointestinal/Hepatic: Denies abdominal pain, nausea, vomiting, diarrhea, constipation or GI bleeding   Genitourinary: Denies bladder dysfunction, dysuria or frequency  Musculoskeletal/Rheum: Denies  joint pain and swelling   Skin/Breast: Denies rash, denies breast lumps or  discharge  Neurological: Denies headache, confusion, memory loss or focal weakness/parasthesias  Psychiatric: denies mood disorder   Endocrine: denies hx of diabetes or thyroid dysfunction  Heme/Oncology/Lymph Nodes: Denies enlarged lymph nodes, denies brusing or known bleeding disorder  Allergic/Immunologic:  hx of allergies      All other systems were reviewed and are negative (AMA/CMS criteria)      Blood pressure 120/80, pulse 43, height 1.524 m (5'), weight 77.565 kg (171 lb), SpO2 92 %.  General Appearance: Well developed, Well nourished, No acute distress, Non-toxic appearance. Reproducible costochondral pain;  HENT: Normocephalic, Atraumatic, Oropharynx moist mucous membranes, Dentition: Mallampti 3-4 OP, Nose normal.    Eyes: PERRLA, EOMI, Conjunctiva normal, No discharge.  Neck: Normal range of motion, No cervical tenderness, Supple, No stridor, no JVD . No thyromegaly. No carotid bruit.  Cardiovascular: Slow heart rate, Normal rhythm, nl S1, S2, no S3, S4; No gallops; 1/6 SE murmurs at M&LLSB, No rubs, . Extremitites with intact distal pulses, no cyanosis, clubbing , edema. No heaves, thrills, HJR;  Peripheral pulses: carotid 2+, brachial 2+, radial 2+, ulnar 2+, femoral 2+, popliteal 1+, PT 2+, DP 2+;  Lungs: Respiratory effort is normal. Normal breath sounds, breath sounds clear to auscultation bilaterally, no rales, no rhonchi, no wheezing.    Abdomen: Bowel sounds normal, Soft, No tenderness, No guarding, No rebound, No masses, No hepatosplenomegaly.  Skin: Warm, Dry, No erythema, No rash, no induration or crepitus.  Neurologic: Alert & oriented x 3, Normal motor function, Normal sensory function, No focal deficits noted, cranial nerves II through XII are normal, normal gait w/o cane.  Psychiatric: Affect normal, Judgment normal, Mood normal    Results for DAI PATEL (MRN [id number]) as of 8/17/2017 12:38   Ref. Range 12/20/2016 07:30 7/20/2017 09:40 8/14/2017 12:53   WBC Latest Ref Range: 4.8-10.8  K/uL 8.3 6.7    RBC Latest Ref Range: 4.20-5.40 M/uL 4.59 4.71    Hemoglobin Latest Ref Range: 13.0-17.0 g/dL 14.8 14.4    Hematocrit Latest Ref Range: 39.0-50.0 % 46.6 44.8    MCV Latest Ref Range: 81.0-99.0 fL 101.5 (H) 95.1    MCH Latest Ref Range: 27.0-31.0 pg 32.2 (H) 30.6    MCHC Latest Ref Range: 33.0-37.0 g/dL 31.8 (L) 32.1 (L)    RDW Latest Ref Range: 11.5-14.5 % 13.7 14.1    Platelet Count Latest Ref Range: 130-400 K/uL 206 179    MPV Latest Ref Range: 7.4-10.4 fL 9.2 9.7    Neutrophils Automated Latest Ref Range: 39.0-70.0 % 74.9 (H) 70.9 (H)    Abs Neutrophils Automated Latest Ref Range: 1.8-7.7 K/uL 6.2 4.7    Lymphocytes Automated Latest Ref Range: 21.0-50.0 % 18.3 (L) 24.1    Abs Lymph Automated Latest Ref Range: 1.2-4.8 K/uL 1.5 1.6    Eosinophils Automated Latest Ref Range: 0.0-5.0 % 0.8 0.4    Basophils Automated Latest Ref Range: 0.0-3.0 % 0.4 0.3    Monocytes Automated Latest Ref Range: 2.0-9.0 % 5.0 3.9    Eosinophil Count, Blood Latest Ref Range: 0.00-0.50 K/uL 0.07 0.03    Sodium Latest Ref Range: 136-145 mmol/L 149 (H) 143    Potassium Latest Ref Range: 3.5-5.1 mmol/L 4.5 4.1    Chloride Latest Ref Range:  mmol/L 110 (H) 107    Co2 Latest Ref Range: 21-32 mmol/L 31 28    Anion Gap Latest Ref Range: 10-18 mmol/L 13 12    Glucose Latest Ref Range: 74-99 mg/dL 116 (H) 108 (H)    Bun Latest Ref Range: 7-18 mg/dL 23 (H) 18    Creatinine Latest Ref Range: 0.6-1.0 mg/dL 0.7 0.7    GFR If  Latest Ref Range: >60 mL/min/1.73 m 2 >60 >60    GFR If Non  Latest Ref Range: >60 mL/min/1.73 m 2 >60 >60    Calcium Latest Ref Range: 8.5-11.0 mg/dL 10.3 9.4    AST(SGOT) Latest Ref Range: 15-37 U/L 18 17    ALT(SGPT) Latest Ref Range: 12-78 U/L 26 27    Alkaline Phosphatase Latest Ref Range:  U/L 96 87    Total Bilirubin Latest Ref Range: 0.2-1.0 mg/dL 0.5 0.5    Albumin Latest Ref Range: 3.4-5.0 g/dL 3.6 3.7    Total Protein Latest Ref Range: 6.4-8.2 g/dL 6.4 6.8     A-G Ratio Unknown 1.3 1.2    IgA Total Latest Ref Range:  mg/dL 74 (L) 85    IgG Total Latest Ref Range: 694-1618 mg/dL 675 (L) 901    IgM Total Latest Ref Range:  mg/dL 44 (L) 33 (L)    Total Protein Latest Ref Range: 6.1-8.1 g/dL 6.3 6.4    Albumin Latest Ref Range: 3.8-4.8 g/dL 3.9 3.9    Gamma Globulin Latest Ref Range: 0.8-1.7 g/dL 0.7 (L) 0.9    Alpha-1 Globulin Latest Ref Range: 0.2-0.3 g/dL 0.3 0.2    Alpha-2 Globulin Latest Ref Range: 0.5-0.9 g/dL 0.8 0.7    Free Kappa Light Chains Latest Ref Range: 3.3-19.4 mg/L 9.2 8.1    Free Lambda Light Chains Latest Ref Range: 5.7-26.3 mg/L 12.0 8.1    Kappa-Lambda Ratio Latest Ref Range: 0.26-1.65  0.77 1.00    Protein Elect. Interp Unknown See Below See Below        Assessment and Plan.   71 y.o. female is with ENT, sleep eval with REINALDO, acid reflux and running nose; Pepsid use may help; She will also try to cut down artificial sweetner use and try to lose weight.     1. Cardiomegaly  reviewed    2. Edema, unspecified type  Mild radha on left but stable    3. Essential hypertension  controlled    4. Obesity (BMI 30.0-34.9)  Stable, see above    5. REINALDO on CPAP  continue    6. Hyperlipidemia, unspecified hyperlipidemia type  recheck      Return to clinic in  5  months    1. Cardiomegaly     2. Edema, unspecified type     3. Essential hypertension     4. Obesity (BMI 30.0-34.9)     5. REINALDO on CPAP     6. Hyperlipidemia, unspecified hyperlipidemia type               Gerardo Bishop M.D.  [address] [place] [address] [place] [address]  VIA Facsimile: [phone]

## 2017-12-05 ENCOUNTER — OFFICE VISIT (OUTPATIENT)
Dept: CARDIOLOGY | Facility: CLINIC | Age: 72
End: 2017-12-05
Payer: MEDICARE

## 2017-12-05 VITALS
SYSTOLIC BLOOD PRESSURE: 110 MMHG | WEIGHT: 171 LBS | OXYGEN SATURATION: 90 % | BODY MASS INDEX: 33.57 KG/M2 | HEART RATE: 70 BPM | HEIGHT: 60 IN | DIASTOLIC BLOOD PRESSURE: 70 MMHG

## 2017-12-05 DIAGNOSIS — R60.9 EDEMA, UNSPECIFIED TYPE: ICD-10-CM

## 2017-12-05 DIAGNOSIS — E66.9 OBESITY (BMI 30.0-34.9): ICD-10-CM

## 2017-12-05 DIAGNOSIS — I10 ESSENTIAL HYPERTENSION: ICD-10-CM

## 2017-12-05 DIAGNOSIS — G47.33 OSA ON CPAP: ICD-10-CM

## 2017-12-05 DIAGNOSIS — I51.7 CARDIOMEGALY: ICD-10-CM

## 2017-12-05 DIAGNOSIS — E78.5 HYPERLIPIDEMIA, UNSPECIFIED HYPERLIPIDEMIA TYPE: ICD-10-CM

## 2017-12-05 PROCEDURE — 99214 OFFICE O/P EST MOD 30 MIN: CPT | Performed by: INTERNAL MEDICINE

## 2017-12-05 NOTE — PROGRESS NOTES
Chief Complaint   Patient presents with   • Follow-Up     HTN    This patient is an established female who is here today to discuss:  HTN controlled and no new complaints  REINALDO on CPAP;  ENT follow up on her sneezing      There are no active problems to display for this patient.      Past Medical History:   Diagnosis Date   • Arrhythmia     IRREGULAR HEART RATE HX.//BRADYCARDIA  AT TIMES   • Cancer (CMS-HCC)     PT STATES HX OF MGUS (pre cancer condition)   • Hypertension     ON LISINOPRIL   • Other specified disorder of intestines     INTERNAL HEMORROIDS   • Pain     chronic rt leg muscle ache   • Pelvic fracture (CMS-HCC)     non traumatic     Past Surgical History:   Procedure Laterality Date   • COLONOSCOPY - ENDO  10/23/2013    Performed by Surinder Sanchez M.D. at [facility]   • COLONOSCOPY  2008    family hx of colon cancer   • APPENDECTOMY     • CATARACT EXTRACTION WITH IOL     • EXPLORATORY LAPAROTOMY     • OOPHORECTOMY       Social History     Social History   • Marital status: Single     Spouse name: N/A   • Number of children: N/A   • Years of education: N/A     Social History Main Topics   • Smoking status: Former Smoker     Quit date: 10/21/1996   • Smokeless tobacco: Not on file   • Alcohol use Yes   • Drug use: No   • Sexual activity: Not on file     Other Topics Concern   • Not on file     Social History Narrative   • No narrative on file     Family History   Problem Relation Age of Onset   • Heart Disease Maternal Uncle        Current Outpatient Prescriptions   Medication Sig Dispense Refill   • naproxen (NAPROSYN) 500 MG Tab      • Potassium Bicarbonate 99 MG Cap Take  by mouth.     • magnesium oxide (MAG-OX) 400 MG Tab Take 400 mg by mouth every day.     • tramadol (ULTRAM) 50 MG Tab      • acyclovir (ZOVIRAX) 400 MG tablet      • furosemide (LASIX) 20 MG Tab Take 20 mg by mouth 2 times a day.     • nystatin (MYCOSTATIN) 980787 UNIT/ML SUSP      • lisinopril-hydrochlorothiazide (PRINZIDE,  ZESTORETIC) 10-12.5 MG per tablet Take 1 Tab by mouth every day.     • hydrocodone-acetaminophen (NORCO) 5-325 MG TABS per tablet Take 1-2 Tabs by mouth every four hours as needed.     • dexamethasone (DECADRON) 4 MG TABS Take 40 mg by mouth every day.     • Bortezomib (VELCADE INJ) by Injection route every 7 days.     • citalopram (CELEXA) 20 MG TABS Take 20 mg by mouth every day.     • calcium carbonate (OS-NICOLE 500) 1250 MG TABS Take 2,500 mg by mouth every day.     • Cholecalciferol (VITAMIN D-3 PO) Take  by mouth.     • therapeutic multivitamin-minerals (THERAGRAN-M) TABS Take 1 Tab by mouth every day.       No current facility-administered medications for this visit.      Sulfa drugs    Review of Systems:     Constitutional: Denies fevers, Denies weight changes  Eyes: Denies changes in vision, no eye pain  Ears/Nose/Throat/Mouth: Denies nasal congestion or sore throat   Cardiovascular: Denies chest pain or palpitations   Respiratory: Denies shortness of breath , Denies cough  Gastrointestinal/Hepatic: Denies abdominal pain, nausea, vomiting, diarrhea, constipation or GI bleeding   Genitourinary: Denies bladder dysfunction, dysuria or frequency  Musculoskeletal/Rheum: Denies  joint pain and swelling   Skin/Breast: Denies rash, denies breast lumps or discharge  Neurological: Denies headache, confusion, memory loss or focal weakness/parasthesias  Psychiatric: denies mood disorder   Endocrine: denies hx of diabetes or thyroid dysfunction  Heme/Oncology/Lymph Nodes: Denies enlarged lymph nodes, denies brusing or known bleeding disorder  Allergic/Immunologic: Denies hx of allergies      All other systems were reviewed and are negative (AMA/CMS criteria)      Blood pressure 110/70, pulse 70, height 1.524 m (5'), weight 77.6 kg (171 lb), SpO2 90 %.  General Appearance: Well developed, Well nourished, No acute distress, Non-toxic appearance. Reproducible costochondral pain;  HENT: Normocephalic, Atraumatic, Oropharynx  moist mucous membranes, Dentition: Mallampti 3-4 OP, Nose normal.    Eyes: PERRLA, EOMI, Conjunctiva normal, No discharge.  Neck: Normal range of motion, No cervical tenderness, Supple, No stridor, no JVD . No thyromegaly. No carotid bruit.  Cardiovascular: Slow heart rate, Normal rhythm, nl S1, S2, no S3, S4; No gallops; 1/6 SE murmurs at M&LLSB, No rubs, . Extremitites with intact distal pulses, no cyanosis, clubbing , edema. No heaves, thrills, HJR;  Peripheral pulses: carotid 2+, brachial 2+, radial 2+, ulnar 2+, femoral 2+, popliteal 1+, PT 2+, DP 2+;  Lungs: Respiratory effort is normal. Normal breath sounds, breath sounds clear to auscultation bilaterally, no rales, no rhonchi, no wheezing.    Abdomen: Bowel sounds normal, Soft, No tenderness, No guarding, No rebound, No masses, No hepatosplenomegaly.  Skin: Warm, Dry, No erythema, No rash, no induration or crepitus.  Neurologic: Alert & oriented x 3, Normal motor function, Normal sensory function, No focal deficits noted, cranial nerves II through XII are normal, normal gait w/o cane.  Psychiatric: Affect normal, Judgment normal, Mood normal    Results for DAI PATEL (MRN [id number]) as of 12/5/2017 10:02   Ref. Range 12/20/2016 07:30 7/20/2017 09:40 8/14/2017 12:53 11/20/2017 07:40 11/28/2017 08:50   WBC Latest Ref Range: 4.8 - 10.8 K/uL 8.3 6.7  6.0    RBC Latest Ref Range: 4.20 - 5.40 M/uL 4.59 4.71  4.78    Hemoglobin Latest Ref Range: 13.0 - 17.0 g/dL 14.8 14.4  14.7    Hematocrit Latest Ref Range: 39.0 - 50.0 % 46.6 44.8  46.4    MCV Latest Ref Range: 81.0 - 99.0 fL 101.5 (H) 95.1  97.1    MCH Latest Ref Range: 27.0 - 31.0 pg 32.2 (H) 30.6  30.8    MCHC Latest Ref Range: 33.0 - 37.0 g/dL 31.8 (L) 32.1 (L)  31.7 (L)    RDW Latest Ref Range: 11.5 - 14.5 % 13.7 14.1  13.8    Platelet Count Latest Ref Range: 130 - 400 K/uL 206 179  113 (L)    MPV Latest Ref Range: 7.4 - 10.4 fL 9.2 9.7  10.2    Neutrophils Automated Latest Ref Range: 39.0 - 70.0 % 74.9  (H) 70.9 (H)  63.5    Abs Neutrophils Automated Latest Ref Range: 1.8 - 7.7 K/uL 6.2 4.7  3.8    Lymphocytes Automated Latest Ref Range: 21.0 - 50.0 % 18.3 (L) 24.1  30.3    Abs Lymph Automated Latest Ref Range: 1.2 - 4.8 K/uL 1.5 1.6  1.8    Eosinophils Automated Latest Ref Range: 0.0 - 5.0 % 0.8 0.4  1.0    Basophils Automated Latest Ref Range: 0.0 - 3.0 % 0.4 0.3  0.7    Monocytes Automated Latest Ref Range: 2.0 - 9.0 % 5.0 3.9  4.0    Eosinophil Count, Blood Latest Ref Range: 0.00 - 0.50 K/uL 0.07 0.03  0.06    Sodium Latest Ref Range: 136 - 145 mmol/L 149 (H) 143  142    Potassium Latest Ref Range: 3.5 - 5.1 mmol/L 4.5 4.1  4.2    Chloride Latest Ref Range: 98 - 107 mmol/L 110 (H) 107  106    Co2 Latest Ref Range: 21 - 32 mmol/L 31 28  28    Anion Gap Latest Ref Range: 10 - 18 mmol/L 13 12  12    Glucose Latest Ref Range: 74 - 99 mg/dL 116 (H) 108 (H)  126 (H)    Bun Latest Ref Range: 7 - 18 mg/dL 23 (H) 18  24 (H)    Creatinine Latest Ref Range: 0.6 - 1.0 mg/dL 0.7 0.7  0.7    GFR If  Latest Ref Range: >60 mL/min/1.73 m 2 >60 >60  >60    GFR If Non  Latest Ref Range: >60 mL/min/1.73 m 2 >60 >60  >60    Calcium Latest Ref Range: 8.5 - 11.0 mg/dL 10.3 9.4  10.8    AST(SGOT) Latest Ref Range: 15 - 37 U/L 18 17  16    ALT(SGPT) Latest Ref Range: 12 - 78 U/L 26 27  17    Alkaline Phosphatase Latest Ref Range: 46 - 116 U/L 96 87  111    Total Bilirubin Latest Ref Range: 0.2 - 1.0 mg/dL 0.5 0.5  0.3    Albumin Latest Ref Range: 3.4 - 5.0 g/dL 3.6 3.7  3.5    Total Protein Latest Ref Range: 6.4 - 8.2 g/dL 6.4 6.8  6.8    A-G Ratio Unknown 1.3 1.2  1.1    Cholesterol,Tot Latest Ref Range: 120 - 200 mg/dL     198   Triglycerides Latest Ref Range: 0 - 150 mg/dL     99   HDL Latest Ref Range: 40.0 - 60.0 mg/dL     67.0 (H)   Non HDL Cholesterol Latest Ref Range: 30 - 160      131   LDL Latest Ref Range: <100 mg/dL     111 (H)   Chol-Hdl Ratio Unknown     2.96     Assessment and Plan.   72  y.o. female has above mentioned; HTN is better controlled; REINALDO on CPAP; WT stable, no cardiac issues  Will use mask to see if it will help her dripping nose;     1. Edema, unspecified type  gone    2. Cardiomegaly  reviewed    3. Essential hypertension  controlled    4. Obesity (BMI 30.0-34.9)  stable    5. REINALDO on CPAP  continue    6. Hyperlipidemia, unspecified hyperlipidemia type  Reviewed and recheck        Return to clinic in  3 , months    1. Edema, unspecified type     2. Cardiomegaly     3. Essential hypertension     4. Obesity (BMI 30.0-34.9)     5. REINALDO on CPAP     6. Hyperlipidemia, unspecified hyperlipidemia type     7. Macrocytosis without anemia

## 2017-12-05 NOTE — LETTER
[facility] [address],   [address]  [place] [address]  Phone: [phone]  Fax: [phone]              Liz Florez  1945    Encounter Date: 12/5/2017    Gerardo Bishop M.D.    Thank you for the referral. I had the pleasure of seeing Liz Florez today in cardiology clinic. I've attached my visit note below. If you have any questions please feel free to give me a call anytime.      Emile Cardoza MD, PhD, [facility]  Cardiology and Lipidology  [facility]                                                                  PROGRESS NOTE:  Chief Complaint   Patient presents with   • Follow-Up     HTN    This patient is an established female who is here today to discuss:  HTN controlled and no new complaints  REINALDO on CPAP;  ENT follow up on her sneezing      There are no active problems to display for this patient.      Past Medical History:   Diagnosis Date   • Arrhythmia     IRREGULAR HEART RATE HX.//BRADYCARDIA  AT TIMES   • Cancer (CMS-HCC)     PT STATES HX OF MGUS (pre cancer condition)   • Hypertension     ON LISINOPRIL   • Other specified disorder of intestines     INTERNAL HEMORROIDS   • Pain     chronic rt leg muscle ache   • Pelvic fracture (CMS-HCC)     non traumatic     Past Surgical History:   Procedure Laterality Date   • COLONOSCOPY - ENDO  10/23/2013    Performed by Surinder Sanchez M[facility]D. at [facility]   • COLONOSCOPY  2008    family hx of colon cancer   • APPENDECTOMY     • CATARACT EXTRACTION WITH IOL     • EXPLORATORY LAPAROTOMY     • OOPHORECTOMY       Social History     Social History   • Marital status: Single     Spouse name: N/A   • Number of children: N/A   • Years of education: N/A     Social History Main Topics   • Smoking status: Former Smoker     Quit date: 10/21/1996   • Smokeless tobacco: Not on file   • Alcohol use Yes   • Drug use: No   • Sexual activity: Not on file      Other Topics Concern   • Not on file     Social History Narrative   • No narrative on file     Family History   Problem Relation Age of Onset   • Heart Disease Maternal Uncle        Current Outpatient Prescriptions   Medication Sig Dispense Refill   • naproxen (NAPROSYN) 500 MG Tab      • Potassium Bicarbonate 99 MG Cap Take  by mouth.     • magnesium oxide (MAG-OX) 400 MG Tab Take 400 mg by mouth every day.     • tramadol (ULTRAM) 50 MG Tab      • acyclovir (ZOVIRAX) 400 MG tablet      • furosemide (LASIX) 20 MG Tab Take 20 mg by mouth 2 times a day.     • nystatin (MYCOSTATIN) 409356 UNIT/ML SUSP      • lisinopril-hydrochlorothiazide (PRINZIDE, ZESTORETIC) 10-12.5 MG per tablet Take 1 Tab by mouth every day.     • hydrocodone-acetaminophen (NORCO) 5-325 MG TABS per tablet Take 1-2 Tabs by mouth every four hours as needed.     • dexamethasone (DECADRON) 4 MG TABS Take 40 mg by mouth every day.     • Bortezomib (VELCADE INJ) by Injection route every 7 days.     • citalopram (CELEXA) 20 MG TABS Take 20 mg by mouth every day.     • calcium carbonate (OS-NICOLE 500) 1250 MG TABS Take 2,500 mg by mouth every day.     • Cholecalciferol (VITAMIN D-3 PO) Take  by mouth.     • therapeutic multivitamin-minerals (THERAGRAN-M) TABS Take 1 Tab by mouth every day.       No current facility-administered medications for this visit.      Sulfa drugs    Review of Systems:     Constitutional: Denies fevers, Denies weight changes  Eyes: Denies changes in vision, no eye pain  Ears/Nose/Throat/Mouth: Denies nasal congestion or sore throat   Cardiovascular: Denies chest pain or palpitations   Respiratory: Denies shortness of breath , Denies cough  Gastrointestinal/Hepatic: Denies abdominal pain, nausea, vomiting, diarrhea, constipation or GI bleeding   Genitourinary: Denies bladder dysfunction, dysuria or frequency  Musculoskeletal/Rheum: Denies  joint pain and swelling   Skin/Breast: Denies rash, denies breast lumps or  discharge  Neurological: Denies headache, confusion, memory loss or focal weakness/parasthesias  Psychiatric: denies mood disorder   Endocrine: denies hx of diabetes or thyroid dysfunction  Heme/Oncology/Lymph Nodes: Denies enlarged lymph nodes, denies brusing or known bleeding disorder  Allergic/Immunologic: Denies hx of allergies      All other systems were reviewed and are negative (AMA/CMS criteria)      Blood pressure 110/70, pulse 70, height 1.524 m (5'), weight 77.6 kg (171 lb), SpO2 90 %.  General Appearance: Well developed, Well nourished, No acute distress, Non-toxic appearance. Reproducible costochondral pain;  HENT: Normocephalic, Atraumatic, Oropharynx moist mucous membranes, Dentition: Mallampti 3-4 OP, Nose normal.    Eyes: PERRLA, EOMI, Conjunctiva normal, No discharge.  Neck: Normal range of motion, No cervical tenderness, Supple, No stridor, no JVD . No thyromegaly. No carotid bruit.  Cardiovascular: Slow heart rate, Normal rhythm, nl S1, S2, no S3, S4; No gallops; 1/6 SE murmurs at M&LLSB, No rubs, . Extremitites with intact distal pulses, no cyanosis, clubbing , edema. No heaves, thrills, HJR;  Peripheral pulses: carotid 2+, brachial 2+, radial 2+, ulnar 2+, femoral 2+, popliteal 1+, PT 2+, DP 2+;  Lungs: Respiratory effort is normal. Normal breath sounds, breath sounds clear to auscultation bilaterally, no rales, no rhonchi, no wheezing.    Abdomen: Bowel sounds normal, Soft, No tenderness, No guarding, No rebound, No masses, No hepatosplenomegaly.  Skin: Warm, Dry, No erythema, No rash, no induration or crepitus.  Neurologic: Alert & oriented x 3, Normal motor function, Normal sensory function, No focal deficits noted, cranial nerves II through XII are normal, normal gait w/o cane.  Psychiatric: Affect normal, Judgment normal, Mood normal    Results for DAI PATEL (MRN [id number]) as of 12/5/2017 10:02   Ref. Range 12/20/2016 07:30 7/20/2017 09:40 8/14/2017 12:53 11/20/2017 07:40 11/28/2017  08:50   WBC Latest Ref Range: 4.8 - 10.8 K/uL 8.3 6.7  6.0    RBC Latest Ref Range: 4.20 - 5.40 M/uL 4.59 4.71  4.78    Hemoglobin Latest Ref Range: 13.0 - 17.0 g/dL 14.8 14.4  14.7    Hematocrit Latest Ref Range: 39.0 - 50.0 % 46.6 44.8  46.4    MCV Latest Ref Range: 81.0 - 99.0 fL 101.5 (H) 95.1  97.1    MCH Latest Ref Range: 27.0 - 31.0 pg 32.2 (H) 30.6  30.8    MCHC Latest Ref Range: 33.0 - 37.0 g/dL 31.8 (L) 32.1 (L)  31.7 (L)    RDW Latest Ref Range: 11.5 - 14.5 % 13.7 14.1  13.8    Platelet Count Latest Ref Range: 130 - 400 K/uL 206 179  113 (L)    MPV Latest Ref Range: 7.4 - 10.4 fL 9.2 9.7  10.2    Neutrophils Automated Latest Ref Range: 39.0 - 70.0 % 74.9 (H) 70.9 (H)  63.5    Abs Neutrophils Automated Latest Ref Range: 1.8 - 7.7 K/uL 6.2 4.7  3.8    Lymphocytes Automated Latest Ref Range: 21.0 - 50.0 % 18.3 (L) 24.1  30.3    Abs Lymph Automated Latest Ref Range: 1.2 - 4.8 K/uL 1.5 1.6  1.8    Eosinophils Automated Latest Ref Range: 0.0 - 5.0 % 0.8 0.4  1.0    Basophils Automated Latest Ref Range: 0.0 - 3.0 % 0.4 0.3  0.7    Monocytes Automated Latest Ref Range: 2.0 - 9.0 % 5.0 3.9  4.0    Eosinophil Count, Blood Latest Ref Range: 0.00 - 0.50 K/uL 0.07 0.03  0.06    Sodium Latest Ref Range: 136 - 145 mmol/L 149 (H) 143  142    Potassium Latest Ref Range: 3.5 - 5.1 mmol/L 4.5 4.1  4.2    Chloride Latest Ref Range: 98 - 107 mmol/L 110 (H) 107  106    Co2 Latest Ref Range: 21 - 32 mmol/L 31 28  28    Anion Gap Latest Ref Range: 10 - 18 mmol/L 13 12  12    Glucose Latest Ref Range: 74 - 99 mg/dL 116 (H) 108 (H)  126 (H)    Bun Latest Ref Range: 7 - 18 mg/dL 23 (H) 18  24 (H)    Creatinine Latest Ref Range: 0.6 - 1.0 mg/dL 0.7 0.7  0.7    GFR If  Latest Ref Range: >60 mL/min/1.73 m 2 >60 >60  >60    GFR If Non  Latest Ref Range: >60 mL/min/1.73 m 2 >60 >60  >60    Calcium Latest Ref Range: 8.5 - 11.0 mg/dL 10.3 9.4  10.8    AST(SGOT) Latest Ref Range: 15 - 37 U/L 18 17  16     ALT(SGPT) Latest Ref Range: 12 - 78 U/L 26 27  17    Alkaline Phosphatase Latest Ref Range: 46 - 116 U/L 96 87  111    Total Bilirubin Latest Ref Range: 0.2 - 1.0 mg/dL 0.5 0.5  0.3    Albumin Latest Ref Range: 3.4 - 5.0 g/dL 3.6 3.7  3.5    Total Protein Latest Ref Range: 6.4 - 8.2 g/dL 6.4 6.8  6.8    A-G Ratio Unknown 1.3 1.2  1.1    Cholesterol,Tot Latest Ref Range: 120 - 200 mg/dL     198   Triglycerides Latest Ref Range: 0 - 150 mg/dL     99   HDL Latest Ref Range: 40.0 - 60.0 mg/dL     67.0 (H)   Non HDL Cholesterol Latest Ref Range: 30 - 160      131   LDL Latest Ref Range: <100 mg/dL     111 (H)   Chol-Hdl Ratio Unknown     2.96     Assessment and Plan.   72 y.o. female has above mentioned; HTN is better controlled; REINALDO on CPAP; WT stable, no cardiac issues  Will use mask to see if it will help her dripping nose;     1. Edema, unspecified type  gone    2. Cardiomegaly  reviewed    3. Essential hypertension  controlled    4. Obesity (BMI 30.0-34.9)  stable    5. REINALDO on CPAP  continue    6. Hyperlipidemia, unspecified hyperlipidemia type  Reviewed and recheck        Return to clinic in  3 , months    1. Edema, unspecified type     2. Cardiomegaly     3. Essential hypertension     4. Obesity (BMI 30.0-34.9)     5. REINALDO on CPAP     6. Hyperlipidemia, unspecified hyperlipidemia type     7. Macrocytosis without anemia           Gerardo Bishop M.D.  [address] [place] [address] [place] [address]  VIA Facsimile: [phone]

## 2018-03-06 ENCOUNTER — OFFICE VISIT (OUTPATIENT)
Dept: CARDIOLOGY | Facility: CLINIC | Age: 73
End: 2018-03-06
Payer: MEDICARE

## 2018-03-06 VITALS
HEIGHT: 60 IN | SYSTOLIC BLOOD PRESSURE: 110 MMHG | OXYGEN SATURATION: 98 % | WEIGHT: 166 LBS | BODY MASS INDEX: 32.59 KG/M2 | DIASTOLIC BLOOD PRESSURE: 78 MMHG | HEART RATE: 59 BPM

## 2018-03-06 DIAGNOSIS — E78.49 OTHER HYPERLIPIDEMIA: ICD-10-CM

## 2018-03-06 DIAGNOSIS — I10 ESSENTIAL HYPERTENSION: ICD-10-CM

## 2018-03-06 DIAGNOSIS — G47.33 OSA (OBSTRUCTIVE SLEEP APNEA): ICD-10-CM

## 2018-03-06 PROCEDURE — 99214 OFFICE O/P EST MOD 30 MIN: CPT | Performed by: INTERNAL MEDICINE

## 2018-03-06 RX ORDER — PHENTERMINE HYDROCHLORIDE 15 MG/1
30 CAPSULE ORAL EVERY MORNING
COMMUNITY
End: 2020-08-24

## 2018-03-06 RX ORDER — IPRATROPIUM BROMIDE 42 UG/1
1 SPRAY, METERED NASAL
COMMUNITY
Start: 2018-02-11

## 2018-03-06 ASSESSMENT — ENCOUNTER SYMPTOMS
SHORTNESS OF BREATH: 0
PALPITATIONS: 0
ORTHOPNEA: 0
DEPRESSION: 0
FALLS: 0
DIZZINESS: 0
PND: 0
LOSS OF CONSCIOUSNESS: 0
ABDOMINAL PAIN: 0

## 2018-03-06 NOTE — LETTER
[facility] [address],   [address]nd [place] [address]  Phone: [phone]  Fax: [phone]              Liz Florez  1945    Encounter Date: 3/6/2018    Coral Meadows M.D.          PROGRESS NOTE:  Subjective:   Liz Florez is a 72 y.o. female presenting to clinic for follow-up on her hypertension. Her blood pressure usually runs about high 130s. She's been compliant with her medications. Denies any dizziness.    She has a history of hyperlipidemia. Once to know what her lipid panel showed recently.    She has obstructive sleep apnea for which she is compliant with her CPAP.    We discussed food diary with the patient. She has been eating hot dogs every day for lunch. She does not exercise.    Past Medical History:   Diagnosis Date   • Arrhythmia     IRREGULAR HEART RATE HX.//BRADYCARDIA  AT TIMES   • Cancer (CMS-HCC)     PT STATES HX OF MGUS (pre cancer condition)   • Hypertension     ON LISINOPRIL   • Other specified disorder of intestines     INTERNAL HEMORROIDS   • Pain     chronic rt leg muscle ache   • Pelvic fracture (CMS-HCC)     non traumatic     Past Surgical History:   Procedure Laterality Date   • COLONOSCOPY - ENDO  10/23/2013    Performed by Surinder Sanchez M.D. at [facility]   • COLONOSCOPY  2008    family hx of colon cancer   • APPENDECTOMY     • CATARACT EXTRACTION WITH IOL     • EXPLORATORY LAPAROTOMY     • OOPHORECTOMY       Family History   Problem Relation Age of Onset   • Heart Disease Maternal Uncle      History   Smoking Status   • Former Smoker   • Quit date: 10/21/1996   Smokeless Tobacco   • Never Used     Allergies   Allergen Reactions   • Sulfa Drugs Swelling     Outpatient Encounter Prescriptions as of 3/6/2018   Medication Sig Dispense Refill   • ipratropium (ATROVENT) 0.06 % Solution      • phentermine 15 MG capsule Take 15 mg by mouth every morning.     • naproxen (NAPROSYN) 500 MG Tab       • Potassium Bicarbonate 99 MG Cap Take  by mouth.     • magnesium oxide (MAG-OX) 400 MG Tab Take 400 mg by mouth every day.     • tramadol (ULTRAM) 50 MG Tab      • acyclovir (ZOVIRAX) 400 MG tablet      • lisinopril-hydrochlorothiazide (PRINZIDE, ZESTORETIC) 10-12.5 MG per tablet Take 1 Tab by mouth every day.     • citalopram (CELEXA) 20 MG TABS Take 20 mg by mouth every day.     • calcium carbonate (OS-NICOLE 500) 1250 MG TABS Take 2,500 mg by mouth every day.     • Cholecalciferol (VITAMIN D-3 PO) Take  by mouth.     • therapeutic multivitamin-minerals (THERAGRAN-M) TABS Take 1 Tab by mouth every day.     • [DISCONTINUED] furosemide (LASIX) 20 MG Tab Take 20 mg by mouth 2 times a day.     • nystatin (MYCOSTATIN) 763965 UNIT/ML SUSP      • hydrocodone-acetaminophen (NORCO) 5-325 MG TABS per tablet Take 1-2 Tabs by mouth every four hours as needed.     • [DISCONTINUED] dexamethasone (DECADRON) 4 MG TABS Take 40 mg by mouth every day.     • [DISCONTINUED] Bortezomib (VELCADE INJ) by Injection route every 7 days.       No facility-administered encounter medications on file as of 3/6/2018.      Review of Systems   Constitutional: Negative for malaise/fatigue.   Respiratory: Negative for shortness of breath.    Cardiovascular: Negative for chest pain, palpitations, orthopnea, leg swelling and PND.   Gastrointestinal: Negative for abdominal pain.   Musculoskeletal: Negative for falls.   Skin: Negative.    Neurological: Negative for dizziness and loss of consciousness.   Psychiatric/Behavioral: Negative for depression.   All other systems reviewed and are negative.       Objective:   /78   Pulse (!) 59   Ht 1.524 m (5')   Wt 75.3 kg (166 lb)   SpO2 98%   BMI 32.42 kg/m²      Physical Exam   Constitutional: She is oriented to person, place, and time. No distress.   HENT:   Head: Normocephalic and atraumatic.   Eyes: Conjunctivae are normal.   Neck: Normal range of motion. Neck supple. No JVD present.      Cardiovascular: Normal rate, regular rhythm and normal heart sounds.  Exam reveals no gallop and no friction rub.    No murmur heard.  Pulmonary/Chest: Effort normal and breath sounds normal. No respiratory distress. She has no wheezes. She has no rales.   Abdominal: Soft. There is no tenderness.   Musculoskeletal: She exhibits no edema.   Neurological: She is alert and oriented to person, place, and time.   Skin: Skin is warm and dry. She is not diaphoretic.   Psychiatric: She has a normal mood and affect.   Nursing note and vitals reviewed.    Lipids from February 2018 was reviewed and showed LDL 94.    Labs from November 2017 was reviewed and showed normal creatinine.    Assessment:     1. Essential hypertension  BASIC METABOLIC PANEL   2. Other hyperlipidemia     3. REINALDO (obstructive sleep apnea)         Medical Decision Making:  Today's Assessment / Status / Plan:     Hypertension: Blood pressure is at goal. Continue lisinopril and HCTZ at current dose. Repeat Chem-7 was ordered to reevaluate her renal function.    Hyperlipidemia: Recent lipid panel was reviewed. LDL at goal. HDL very good. No changes in meds for now.    Obstructive sleep apnea: Patient is compliant with her CPAP.    A majority of the time was spent discussing patient's diet. I have advised the patient to avoid red meat. She should avoid eating hot dogs every day. She should primarily stick with white meat. More fruits and vegetables.  She should start a low-level aerobic exercise regimen and increase as tolerated.    Return to clinic in 6 months or earlier if needed.    Thank you for allowing me to participate in the care of this patient. Please do not hesitate to contact me with any questions.    Coral Meadows MD  Cardiologist  [facility]      PLEASE NOTE: This dictation was created using voice recognition software.             Gerardo Bishop M.D.  [address] [place] [address] [place] [address]  VIA Facsimile:  [phone]

## 2018-03-06 NOTE — PROGRESS NOTES
Subjective:   Liz Florez is a 72 y.o. female presenting to clinic for follow-up on her hypertension. Her blood pressure usually runs about high 130s. She's been compliant with her medications. Denies any dizziness.    She has a history of hyperlipidemia. Once to know what her lipid panel showed recently.    She has obstructive sleep apnea for which she is compliant with her CPAP.    We discussed food diary with the patient. She has been eating hot dogs every day for lunch. She does not exercise.    Past Medical History:   Diagnosis Date   • Arrhythmia     IRREGULAR HEART RATE HX.//BRADYCARDIA  AT TIMES   • Cancer (CMS-HCC)     PT STATES HX OF MGUS (pre cancer condition)   • Hypertension     ON LISINOPRIL   • Other specified disorder of intestines     INTERNAL HEMORROIDS   • Pain     chronic rt leg muscle ache   • Pelvic fracture (CMS-HCC)     non traumatic     Past Surgical History:   Procedure Laterality Date   • COLONOSCOPY - ENDO  10/23/2013    Performed by Surinder Sanchez M.D. at SURGERY [facility]   • COLONOSCOPY  2008    family hx of colon cancer   • APPENDECTOMY     • CATARACT EXTRACTION WITH IOL     • EXPLORATORY LAPAROTOMY     • OOPHORECTOMY       Family History   Problem Relation Age of Onset   • Heart Disease Maternal Uncle      History   Smoking Status   • Former Smoker   • Quit date: 10/21/1996   Smokeless Tobacco   • Never Used     Allergies   Allergen Reactions   • Sulfa Drugs Swelling     Outpatient Encounter Prescriptions as of 3/6/2018   Medication Sig Dispense Refill   • ipratropium (ATROVENT) 0.06 % Solution      • phentermine 15 MG capsule Take 15 mg by mouth every morning.     • naproxen (NAPROSYN) 500 MG Tab      • Potassium Bicarbonate 99 MG Cap Take  by mouth.     • magnesium oxide (MAG-OX) 400 MG Tab Take 400 mg by mouth every day.     • tramadol (ULTRAM) 50 MG Tab      • acyclovir (ZOVIRAX) 400 MG tablet      • lisinopril-hydrochlorothiazide (PRINZIDE, ZESTORETIC) 10-12.5 MG per  tablet Take 1 Tab by mouth every day.     • citalopram (CELEXA) 20 MG TABS Take 20 mg by mouth every day.     • calcium carbonate (OS-NICOLE 500) 1250 MG TABS Take 2,500 mg by mouth every day.     • Cholecalciferol (VITAMIN D-3 PO) Take  by mouth.     • therapeutic multivitamin-minerals (THERAGRAN-M) TABS Take 1 Tab by mouth every day.     • [DISCONTINUED] furosemide (LASIX) 20 MG Tab Take 20 mg by mouth 2 times a day.     • nystatin (MYCOSTATIN) 320951 UNIT/ML SUSP      • hydrocodone-acetaminophen (NORCO) 5-325 MG TABS per tablet Take 1-2 Tabs by mouth every four hours as needed.     • [DISCONTINUED] dexamethasone (DECADRON) 4 MG TABS Take 40 mg by mouth every day.     • [DISCONTINUED] Bortezomib (VELCADE INJ) by Injection route every 7 days.       No facility-administered encounter medications on file as of 3/6/2018.      Review of Systems   Constitutional: Negative for malaise/fatigue.   Respiratory: Negative for shortness of breath.    Cardiovascular: Negative for chest pain, palpitations, orthopnea, leg swelling and PND.   Gastrointestinal: Negative for abdominal pain.   Musculoskeletal: Negative for falls.   Skin: Negative.    Neurological: Negative for dizziness and loss of consciousness.   Psychiatric/Behavioral: Negative for depression.   All other systems reviewed and are negative.       Objective:   /78   Pulse (!) 59   Ht 1.524 m (5')   Wt 75.3 kg (166 lb)   SpO2 98%   BMI 32.42 kg/m²     Physical Exam   Constitutional: She is oriented to person, place, and time. No distress.   HENT:   Head: Normocephalic and atraumatic.   Eyes: Conjunctivae are normal.   Neck: Normal range of motion. Neck supple. No JVD present.   Cardiovascular: Normal rate, regular rhythm and normal heart sounds.  Exam reveals no gallop and no friction rub.    No murmur heard.  Pulmonary/Chest: Effort normal and breath sounds normal. No respiratory distress. She has no wheezes. She has no rales.   Abdominal: Soft. There is no  tenderness.   Musculoskeletal: She exhibits no edema.   Neurological: She is alert and oriented to person, place, and time.   Skin: Skin is warm and dry. She is not diaphoretic.   Psychiatric: She has a normal mood and affect.   Nursing note and vitals reviewed.    Lipids from February 2018 was reviewed and showed LDL 94.    Labs from November 2017 was reviewed and showed normal creatinine.    Assessment:     1. Essential hypertension  BASIC METABOLIC PANEL   2. Other hyperlipidemia     3. REINALDO (obstructive sleep apnea)         Medical Decision Making:  Today's Assessment / Status / Plan:     Hypertension: Blood pressure is at goal. Continue lisinopril and HCTZ at current dose. Repeat Chem-7 was ordered to reevaluate her renal function.    Hyperlipidemia: Recent lipid panel was reviewed. LDL at goal. HDL very good. No changes in meds for now.    Obstructive sleep apnea: Patient is compliant with her CPAP.    A majority of the time was spent discussing patient's diet. I have advised the patient to avoid red meat. She should avoid eating hot dogs every day. She should primarily stick with white meat. More fruits and vegetables.  She should start a low-level aerobic exercise regimen and increase as tolerated.    Return to clinic in 6 months or earlier if needed.    Thank you for allowing me to participate in the care of this patient. Please do not hesitate to contact me with any questions.    Coral Meadows MD  Cardiologist  [facility]      PLEASE NOTE: This dictation was created using voice recognition software.

## 2020-08-21 PROBLEM — Z96.643 HISTORY OF BILATERAL HIP REPLACEMENTS: Status: ACTIVE | Noted: 2020-08-21

## 2020-08-21 PROBLEM — Z99.89 CPAP (CONTINUOUS POSITIVE AIRWAY PRESSURE) DEPENDENCE: Status: ACTIVE | Noted: 2020-08-21

## 2020-08-21 PROBLEM — R41.3 MEMORY LOSS: Status: ACTIVE | Noted: 2020-08-21

## 2020-08-21 PROBLEM — R61 HYPERHIDROSIS: Status: ACTIVE | Noted: 2020-08-21

## 2020-08-21 PROBLEM — D47.2 MGUS (MONOCLONAL GAMMOPATHY OF UNKNOWN SIGNIFICANCE): Status: ACTIVE | Noted: 2020-08-21

## 2020-08-21 PROBLEM — M43.02 CERVICAL SPONDYLOLYSIS: Status: ACTIVE | Noted: 2020-08-21

## 2020-09-21 PROBLEM — R41.89 COGNITIVE IMPAIRMENT: Status: ACTIVE | Noted: 2020-09-21

## 2020-09-21 PROBLEM — M81.0 AGE-RELATED OSTEOPOROSIS WITHOUT CURRENT PATHOLOGICAL FRACTURE: Status: ACTIVE | Noted: 2020-09-21

## 2020-10-22 PROBLEM — R41.3 MEMORY LOSS: Status: RESOLVED | Noted: 2020-08-21 | Resolved: 2020-10-22

## 2020-10-22 PROBLEM — F01.50 VASCULAR DEMENTIA (HCC): Status: ACTIVE | Noted: 2020-10-22

## 2020-10-22 PROBLEM — F32.9 REACTIVE DEPRESSION: Status: ACTIVE | Noted: 2020-10-22

## 2021-01-07 PROBLEM — M79.672 LEFT FOOT PAIN: Status: ACTIVE | Noted: 2021-01-07

## 2021-04-01 PROBLEM — R73.03 PREDIABETES: Status: ACTIVE | Noted: 2021-04-01

## 2021-04-01 PROBLEM — M19.049 HAND ARTHRITIS: Status: ACTIVE | Noted: 2021-04-01

## 2021-04-01 PROBLEM — M76.821 POSTERIOR TIBIAL TENDINITIS OF RIGHT LOWER EXTREMITY: Status: ACTIVE | Noted: 2021-04-01

## 2022-05-21 PROBLEM — R00.1 BRADYCARDIA: Status: ACTIVE | Noted: 2022-05-21

## 2022-06-10 PROBLEM — I48.91 ATRIAL FIBRILLATION (HCC): Status: ACTIVE | Noted: 2022-06-10

## 2022-06-10 PROBLEM — Z95.0 PACEMAKER: Status: ACTIVE | Noted: 2022-06-10

## 2022-06-10 PROBLEM — I49.5 SINUS NODE DYSFUNCTION (HCC): Status: ACTIVE | Noted: 2022-05-22

## 2022-07-21 PROBLEM — Z79.01 CHRONIC ANTICOAGULATION: Status: ACTIVE | Noted: 2022-07-21

## 2023-01-09 PROBLEM — F01.50 VASCULAR DEMENTIA (HCC): Status: RESOLVED | Noted: 2020-10-22 | Resolved: 2023-01-09

## 2023-03-07 PROBLEM — R41.0 TRANSIENT CONFUSION: Status: ACTIVE | Noted: 2022-12-08

## 2023-10-09 PROBLEM — E66.9 CLASS 1 OBESITY: Status: ACTIVE | Noted: 2023-10-09
